# Patient Record
Sex: FEMALE | Race: WHITE | URBAN - METROPOLITAN AREA
[De-identification: names, ages, dates, MRNs, and addresses within clinical notes are randomized per-mention and may not be internally consistent; named-entity substitution may affect disease eponyms.]

---

## 2017-01-05 ENCOUNTER — APPOINTMENT (RX ONLY)
Dept: URBAN - METROPOLITAN AREA CLINIC 348 | Facility: CLINIC | Age: 72
Setting detail: DERMATOLOGY
End: 2017-01-05

## 2017-01-05 DIAGNOSIS — B35.3 TINEA PEDIS: ICD-10-CM

## 2017-01-05 DIAGNOSIS — L26 EXFOLIATIVE DERMATITIS: ICD-10-CM

## 2017-01-05 PROCEDURE — 99213 OFFICE O/P EST LOW 20 MIN: CPT

## 2017-01-05 PROCEDURE — ? TREATMENT REGIMEN

## 2017-01-05 PROCEDURE — ? PRESCRIPTION

## 2017-01-05 PROCEDURE — ? COUNSELING

## 2017-01-05 RX ORDER — KETOCONAZOLE 20 MG/G
CREAM TOPICAL
Qty: 1 | Refills: 3 | Status: ERX | COMMUNITY
Start: 2017-01-05

## 2017-01-05 RX ADMIN — KETOCONAZOLE: 20 CREAM TOPICAL at 19:48

## 2017-01-05 ASSESSMENT — LOCATION SIMPLE DESCRIPTION DERM
LOCATION SIMPLE: RIGHT FOOT
LOCATION SIMPLE: RIGHT HAND
LOCATION SIMPLE: RIGHT 4TH TOE
LOCATION SIMPLE: LEFT HAND

## 2017-01-05 ASSESSMENT — LOCATION DETAILED DESCRIPTION DERM
LOCATION DETAILED: LEFT RADIAL PALM
LOCATION DETAILED: 1ST WEBSPACE RIGHT FOOT
LOCATION DETAILED: RIGHT DORSAL 4TH TOE
LOCATION DETAILED: RIGHT RADIAL PALM

## 2017-01-05 ASSESSMENT — LOCATION ZONE DERM
LOCATION ZONE: FEET
LOCATION ZONE: TOE
LOCATION ZONE: HAND

## 2017-02-07 ENCOUNTER — APPOINTMENT (RX ONLY)
Dept: URBAN - METROPOLITAN AREA CLINIC 348 | Facility: CLINIC | Age: 72
Setting detail: DERMATOLOGY
End: 2017-02-07

## 2017-02-07 DIAGNOSIS — Z85.828 PERSONAL HISTORY OF OTHER MALIGNANT NEOPLASM OF SKIN: ICD-10-CM | Status: RESOLVED

## 2017-02-07 DIAGNOSIS — B35.3 TINEA PEDIS: ICD-10-CM | Status: IMPROVED

## 2017-02-07 DIAGNOSIS — L98.8 OTHER SPECIFIED DISORDERS OF THE SKIN AND SUBCUTANEOUS TISSUE: ICD-10-CM

## 2017-02-07 DIAGNOSIS — D18.0 HEMANGIOMA: ICD-10-CM

## 2017-02-07 DIAGNOSIS — L40.0 PSORIASIS VULGARIS: ICD-10-CM | Status: WELL CONTROLLED

## 2017-02-07 DIAGNOSIS — D22 MELANOCYTIC NEVI: ICD-10-CM

## 2017-02-07 DIAGNOSIS — L81.4 OTHER MELANIN HYPERPIGMENTATION: ICD-10-CM

## 2017-02-07 DIAGNOSIS — L82.1 OTHER SEBORRHEIC KERATOSIS: ICD-10-CM

## 2017-02-07 PROBLEM — E13.9 OTHER SPECIFIED DIABETES MELLITUS WITHOUT COMPLICATIONS: Status: ACTIVE | Noted: 2017-02-07

## 2017-02-07 PROBLEM — D22.71 MELANOCYTIC NEVI OF RIGHT LOWER LIMB, INCLUDING HIP: Status: ACTIVE | Noted: 2017-02-07

## 2017-02-07 PROBLEM — D22.5 MELANOCYTIC NEVI OF TRUNK: Status: ACTIVE | Noted: 2017-02-07

## 2017-02-07 PROBLEM — D22.72 MELANOCYTIC NEVI OF LEFT LOWER LIMB, INCLUDING HIP: Status: ACTIVE | Noted: 2017-02-07

## 2017-02-07 PROBLEM — D18.01 HEMANGIOMA OF SKIN AND SUBCUTANEOUS TISSUE: Status: ACTIVE | Noted: 2017-02-07

## 2017-02-07 PROCEDURE — ? OTHER

## 2017-02-07 PROCEDURE — 99214 OFFICE O/P EST MOD 30 MIN: CPT

## 2017-02-07 PROCEDURE — ? TREATMENT REGIMEN

## 2017-02-07 PROCEDURE — ? DEFER

## 2017-02-07 PROCEDURE — ? COUNSELING

## 2017-02-07 PROCEDURE — ? PRESCRIPTION

## 2017-02-07 RX ORDER — KETOCONAZOLE 20 MG/G
CREAM TOPICAL
Qty: 1 | Refills: 3 | Status: ERX

## 2017-02-07 RX ORDER — CLOBETASOL PROPIONATE 0.05 %
CREAM (GRAM) TOPICAL
Qty: 1 | Refills: 1 | Status: ERX

## 2017-02-07 ASSESSMENT — LOCATION ZONE DERM
LOCATION ZONE: TRUNK
LOCATION ZONE: FEET
LOCATION ZONE: FACE
LOCATION ZONE: ARM
LOCATION ZONE: TOE
LOCATION ZONE: LEG
LOCATION ZONE: NOSE

## 2017-02-07 ASSESSMENT — LOCATION SIMPLE DESCRIPTION DERM
LOCATION SIMPLE: RIGHT 4TH TOE
LOCATION SIMPLE: CHEST
LOCATION SIMPLE: RIGHT POSTERIOR THIGH
LOCATION SIMPLE: RIGHT UPPER ARM
LOCATION SIMPLE: LEFT UPPER BACK
LOCATION SIMPLE: LEFT NOSE
LOCATION SIMPLE: RIGHT FOOT
LOCATION SIMPLE: LEFT UPPER ARM
LOCATION SIMPLE: LEFT FOREHEAD
LOCATION SIMPLE: RIGHT UPPER BACK
LOCATION SIMPLE: RIGHT LOWER BACK
LOCATION SIMPLE: LEFT LOWER BACK
LOCATION SIMPLE: LEFT POSTERIOR THIGH
LOCATION SIMPLE: RIGHT THIGH
LOCATION SIMPLE: LEFT THIGH

## 2017-02-07 ASSESSMENT — LOCATION DETAILED DESCRIPTION DERM
LOCATION DETAILED: RIGHT SUPERIOR UPPER BACK
LOCATION DETAILED: LEFT ANTERIOR PROXIMAL THIGH
LOCATION DETAILED: LEFT INFERIOR UPPER BACK
LOCATION DETAILED: RIGHT DISTAL POSTERIOR THIGH
LOCATION DETAILED: 1ST WEBSPACE RIGHT FOOT
LOCATION DETAILED: LEFT ANTERIOR PROXIMAL UPPER ARM
LOCATION DETAILED: RIGHT ANTERIOR PROXIMAL UPPER ARM
LOCATION DETAILED: LEFT INFERIOR LATERAL MIDBACK
LOCATION DETAILED: RIGHT INFERIOR UPPER BACK
LOCATION DETAILED: RIGHT ANTERIOR DISTAL THIGH
LOCATION DETAILED: RIGHT ANTERIOR PROXIMAL THIGH
LOCATION DETAILED: LEFT SUPERIOR FOREHEAD
LOCATION DETAILED: RIGHT LATERAL SUPERIOR CHEST
LOCATION DETAILED: RIGHT LATERAL UPPER BACK
LOCATION DETAILED: RIGHT DORSAL 4TH TOE
LOCATION DETAILED: LEFT LATERAL SUPERIOR CHEST
LOCATION DETAILED: LEFT NASAL SIDEWALL
LOCATION DETAILED: LEFT ANTERIOR DISTAL THIGH
LOCATION DETAILED: LEFT DISTAL POSTERIOR THIGH
LOCATION DETAILED: RIGHT SUPERIOR LATERAL MIDBACK
LOCATION DETAILED: LEFT SUPERIOR LATERAL UPPER BACK

## 2017-02-07 ASSESSMENT — BSA PSORIASIS: % BODY COVERED IN PSORIASIS: 5

## 2017-02-07 NOTE — HPI: NON-MELANOMA SKIN CANCER F/U (HISTORY OF NMSC)
How Many Skin Cancers Have You Had?: one
What Is The Reason For Today's Visit?: Follow Up Non-Melanoma Skin Cancer
When Was Your Last Cancer Diagnosed?: 03/08/2012

## 2017-02-07 NOTE — PROCEDURE: TREATMENT REGIMEN
Action 2: Continue
Detail Level: Zone
Continue Regimen: Clobetasol cream twice daily to rossi of rash for two weeks then as needed for flares \\nEucerin Eczema relief cream as needed
Continue Regimen: Apply Ketoconazole cream twice daily to both feet
Detail Level: Detailed

## 2018-02-07 ENCOUNTER — APPOINTMENT (RX ONLY)
Dept: URBAN - METROPOLITAN AREA CLINIC 348 | Facility: CLINIC | Age: 73
Setting detail: DERMATOLOGY
End: 2018-02-07

## 2018-02-07 DIAGNOSIS — D485 NEOPLASM OF UNCERTAIN BEHAVIOR OF SKIN: ICD-10-CM

## 2018-02-07 DIAGNOSIS — Z85.828 PERSONAL HISTORY OF OTHER MALIGNANT NEOPLASM OF SKIN: ICD-10-CM | Status: RESOLVED

## 2018-02-07 DIAGNOSIS — D22 MELANOCYTIC NEVI: ICD-10-CM

## 2018-02-07 DIAGNOSIS — L81.4 OTHER MELANIN HYPERPIGMENTATION: ICD-10-CM

## 2018-02-07 DIAGNOSIS — L82.1 OTHER SEBORRHEIC KERATOSIS: ICD-10-CM

## 2018-02-07 DIAGNOSIS — D18.0 HEMANGIOMA: ICD-10-CM

## 2018-02-07 PROBLEM — D18.01 HEMANGIOMA OF SKIN AND SUBCUTANEOUS TISSUE: Status: ACTIVE | Noted: 2018-02-07

## 2018-02-07 PROBLEM — D22.5 MELANOCYTIC NEVI OF TRUNK: Status: ACTIVE | Noted: 2018-02-07

## 2018-02-07 PROBLEM — D48.5 NEOPLASM OF UNCERTAIN BEHAVIOR OF SKIN: Status: ACTIVE | Noted: 2018-02-07

## 2018-02-07 PROCEDURE — 11100: CPT

## 2018-02-07 PROCEDURE — ? OTHER

## 2018-02-07 PROCEDURE — ? COUNSELING

## 2018-02-07 PROCEDURE — ? BIOPSY BY SHAVE METHOD

## 2018-02-07 PROCEDURE — 99214 OFFICE O/P EST MOD 30 MIN: CPT | Mod: 25

## 2018-02-07 ASSESSMENT — LOCATION DETAILED DESCRIPTION DERM
LOCATION DETAILED: RIGHT LATERAL PROXIMAL UPPER ARM
LOCATION DETAILED: LEFT SUPERIOR UPPER BACK
LOCATION DETAILED: LEFT MID-UPPER BACK
LOCATION DETAILED: LEFT NASAL SIDEWALL
LOCATION DETAILED: RIGHT SUPERIOR LATERAL UPPER BACK
LOCATION DETAILED: SUPERIOR THORACIC SPINE

## 2018-02-07 ASSESSMENT — LOCATION SIMPLE DESCRIPTION DERM
LOCATION SIMPLE: LEFT NOSE
LOCATION SIMPLE: LEFT UPPER BACK
LOCATION SIMPLE: RIGHT BACK
LOCATION SIMPLE: RIGHT UPPER ARM
LOCATION SIMPLE: UPPER BACK

## 2018-02-07 ASSESSMENT — LOCATION ZONE DERM
LOCATION ZONE: NOSE
LOCATION ZONE: TRUNK
LOCATION ZONE: ARM

## 2018-02-07 NOTE — HPI: NON-MELANOMA SKIN CANCER F/U (HISTORY OF NMSC)
How Many Skin Cancers Have You Had?: one
What Is The Reason For Today's Visit?: History of Non-Melanoma Skin Cancer
When Was Your Last Cancer Diagnosed?: 2012

## 2018-02-07 NOTE — PROCEDURE: BIOPSY BY SHAVE METHOD
Biopsy Type: H and E
Anesthesia Type: 1% lidocaine without epinephrine
Additional Anesthesia Volume In Cc (Will Not Render If 0): 0
Electrodesiccation And Curettage Text: The wound bed was treated with electrodesiccation and curettage after the biopsy was performed.
Bill [id number] For Specimen Handling/Conveyance To Laboratory?: no
Notification Instructions: Patient to return to clinic in 2-4 weeks for biopsy results. Pt understands results will not be given over the phone.
Type Of Destruction Used: Curettage
Cryotherapy Text: The wound bed was treated with cryotherapy after the biopsy was performed.
Post-Care Instructions: I reviewed with the patient in detail post-care instructions. Patient is to keep the biopsy site dry overnight, and then apply bacitracin twice daily until healed. Patient may apply hydrogen peroxide soaks to remove any crusting.
Electrodesiccation Text: The wound bed was treated with electrodesiccation after the biopsy was performed.
Consent: Written consent was obtained and risks were reviewed including but not limited to scarring, infection, bleeding, scabbing, incomplete removal, nerve damage and allergy to anesthesia.
Curettage Text: The wound bed was treated with curettage after the biopsy was performed.
Anesthesia Volume In Cc (Will Not Render If 0): 1
Dressing: bandage
Detail Level: Detailed
Hemostasis: Aluminum Chloride
Billing Type: Third-Party Bill
Silver Nitrate Text: The wound bed was treated with silver nitrate after the biopsy was performed.
Wound Care: Aquaphor

## 2019-02-08 ENCOUNTER — APPOINTMENT (RX ONLY)
Dept: URBAN - METROPOLITAN AREA CLINIC 348 | Facility: CLINIC | Age: 74
Setting detail: DERMATOLOGY
End: 2019-02-08

## 2019-02-08 DIAGNOSIS — L81.4 OTHER MELANIN HYPERPIGMENTATION: ICD-10-CM

## 2019-02-08 DIAGNOSIS — D18.0 HEMANGIOMA: ICD-10-CM

## 2019-02-08 DIAGNOSIS — L82.1 OTHER SEBORRHEIC KERATOSIS: ICD-10-CM

## 2019-02-08 DIAGNOSIS — L85.3 XEROSIS CUTIS: ICD-10-CM

## 2019-02-08 DIAGNOSIS — Z85.828 PERSONAL HISTORY OF OTHER MALIGNANT NEOPLASM OF SKIN: ICD-10-CM

## 2019-02-08 DIAGNOSIS — D22 MELANOCYTIC NEVI: ICD-10-CM

## 2019-02-08 PROBLEM — D18.01 HEMANGIOMA OF SKIN AND SUBCUTANEOUS TISSUE: Status: ACTIVE | Noted: 2019-02-08

## 2019-02-08 PROBLEM — D22.5 MELANOCYTIC NEVI OF TRUNK: Status: ACTIVE | Noted: 2019-02-08

## 2019-02-08 PROBLEM — F32.9 MAJOR DEPRESSIVE DISORDER, SINGLE EPISODE, UNSPECIFIED: Status: ACTIVE | Noted: 2019-02-08

## 2019-02-08 PROBLEM — L40.0 PSORIASIS VULGARIS: Status: ACTIVE | Noted: 2019-02-08

## 2019-02-08 PROCEDURE — ? OTHER

## 2019-02-08 PROCEDURE — 99213 OFFICE O/P EST LOW 20 MIN: CPT

## 2019-02-08 PROCEDURE — ? COUNSELING

## 2019-02-08 PROCEDURE — ? TREATMENT REGIMEN

## 2019-02-08 ASSESSMENT — LOCATION DETAILED DESCRIPTION DERM
LOCATION DETAILED: SUPERIOR THORACIC SPINE
LOCATION DETAILED: LEFT MID-UPPER BACK
LOCATION DETAILED: LEFT SUPERIOR UPPER BACK
LOCATION DETAILED: LEFT NASAL SIDEWALL
LOCATION DETAILED: RIGHT SUPERIOR LATERAL UPPER BACK
LOCATION DETAILED: LEFT LATERAL ABDOMEN

## 2019-02-08 ASSESSMENT — LOCATION SIMPLE DESCRIPTION DERM
LOCATION SIMPLE: LEFT UPPER BACK
LOCATION SIMPLE: ABDOMEN
LOCATION SIMPLE: LEFT NOSE
LOCATION SIMPLE: RIGHT BACK
LOCATION SIMPLE: UPPER BACK

## 2019-02-08 ASSESSMENT — LOCATION ZONE DERM
LOCATION ZONE: NOSE
LOCATION ZONE: TRUNK

## 2020-02-10 ENCOUNTER — APPOINTMENT (RX ONLY)
Dept: URBAN - METROPOLITAN AREA CLINIC 348 | Facility: CLINIC | Age: 75
Setting detail: DERMATOLOGY
End: 2020-02-10

## 2020-02-10 DIAGNOSIS — Z85.828 PERSONAL HISTORY OF OTHER MALIGNANT NEOPLASM OF SKIN: ICD-10-CM

## 2020-02-10 DIAGNOSIS — D22 MELANOCYTIC NEVI: ICD-10-CM

## 2020-02-10 DIAGNOSIS — D18.0 HEMANGIOMA: ICD-10-CM

## 2020-02-10 DIAGNOSIS — L82.1 OTHER SEBORRHEIC KERATOSIS: ICD-10-CM

## 2020-02-10 DIAGNOSIS — L81.4 OTHER MELANIN HYPERPIGMENTATION: ICD-10-CM

## 2020-02-10 PROBLEM — D22.5 MELANOCYTIC NEVI OF TRUNK: Status: ACTIVE | Noted: 2020-02-10

## 2020-02-10 PROBLEM — D18.01 HEMANGIOMA OF SKIN AND SUBCUTANEOUS TISSUE: Status: ACTIVE | Noted: 2020-02-10

## 2020-02-10 PROCEDURE — ? COUNSELING

## 2020-02-10 PROCEDURE — 99214 OFFICE O/P EST MOD 30 MIN: CPT

## 2020-02-10 PROCEDURE — ? TREATMENT REGIMEN

## 2020-02-10 PROCEDURE — ? OTHER

## 2020-02-10 ASSESSMENT — LOCATION DETAILED DESCRIPTION DERM
LOCATION DETAILED: LEFT SUPERIOR UPPER BACK
LOCATION DETAILED: LEFT NASAL SIDEWALL
LOCATION DETAILED: SUPERIOR THORACIC SPINE
LOCATION DETAILED: LEFT MID-UPPER BACK
LOCATION DETAILED: RIGHT SUPERIOR LATERAL UPPER BACK

## 2020-02-10 ASSESSMENT — LOCATION SIMPLE DESCRIPTION DERM
LOCATION SIMPLE: LEFT NOSE
LOCATION SIMPLE: LEFT UPPER BACK
LOCATION SIMPLE: UPPER BACK
LOCATION SIMPLE: RIGHT BACK

## 2020-02-10 ASSESSMENT — LOCATION ZONE DERM
LOCATION ZONE: NOSE
LOCATION ZONE: TRUNK

## 2020-02-10 NOTE — PROCEDURE: MIPS QUALITY
Quality 111:Pneumonia Vaccination Status For Older Adults: Pneumococcal Vaccination Administered
Quality 47: Advance Care Plan: Advance Care Planning discussed and documented in the medical record; patient did not wish or was not able to name a surrogate decision maker or provide an advance care plan.
Quality 474: Zoster Vaccination Status: Shingrix Vaccination Administered or Previously Received
Detail Level: Detailed
Quality 110: Preventive Care And Screening: Influenza Immunization: Influenza Immunization Administered during Influenza season
Quality 130: Documentation Of Current Medications In The Medical Record: Current Medications Documented
Quality 226: Preventive Care And Screening: Tobacco Use: Screening And Cessation Intervention: Patient screened for tobacco use and is an ex/non-smoker

## 2021-02-08 ENCOUNTER — APPOINTMENT (RX ONLY)
Dept: URBAN - METROPOLITAN AREA CLINIC 348 | Facility: CLINIC | Age: 76
Setting detail: DERMATOLOGY
End: 2021-02-08

## 2021-02-08 DIAGNOSIS — L08.9 LOCAL INFECTION OF THE SKIN AND SUBCUTANEOUS TISSUE, UNSPECIFIED: ICD-10-CM

## 2021-02-08 DIAGNOSIS — L81.4 OTHER MELANIN HYPERPIGMENTATION: ICD-10-CM

## 2021-02-08 DIAGNOSIS — D22 MELANOCYTIC NEVI: ICD-10-CM

## 2021-02-08 DIAGNOSIS — L89 PRESSURE ULCER: ICD-10-CM

## 2021-02-08 DIAGNOSIS — L85.3 XEROSIS CUTIS: ICD-10-CM

## 2021-02-08 DIAGNOSIS — L82.1 OTHER SEBORRHEIC KERATOSIS: ICD-10-CM

## 2021-02-08 DIAGNOSIS — L82.0 INFLAMED SEBORRHEIC KERATOSIS: ICD-10-CM

## 2021-02-08 DIAGNOSIS — D18.0 HEMANGIOMA: ICD-10-CM

## 2021-02-08 DIAGNOSIS — Z85.828 PERSONAL HISTORY OF OTHER MALIGNANT NEOPLASM OF SKIN: ICD-10-CM

## 2021-02-08 PROBLEM — L89.319 PRESSURE ULCER OF RIGHT BUTTOCK, UNSPECIFIED STAGE: Status: ACTIVE | Noted: 2021-02-08

## 2021-02-08 PROBLEM — L89.329 PRESSURE ULCER OF LEFT BUTTOCK, UNSPECIFIED STAGE: Status: ACTIVE | Noted: 2021-02-08

## 2021-02-08 PROBLEM — D18.01 HEMANGIOMA OF SKIN AND SUBCUTANEOUS TISSUE: Status: ACTIVE | Noted: 2021-02-08

## 2021-02-08 PROBLEM — D22.5 MELANOCYTIC NEVI OF TRUNK: Status: ACTIVE | Noted: 2021-02-08

## 2021-02-08 PROCEDURE — ? OTHER

## 2021-02-08 PROCEDURE — ? ORDER TESTS

## 2021-02-08 PROCEDURE — ? PRESCRIPTION

## 2021-02-08 PROCEDURE — ? LIQUID NITROGEN

## 2021-02-08 PROCEDURE — ? COUNSELING

## 2021-02-08 PROCEDURE — ? TREATMENT REGIMEN

## 2021-02-08 PROCEDURE — 99213 OFFICE O/P EST LOW 20 MIN: CPT | Mod: 25

## 2021-02-08 PROCEDURE — 17110 DESTRUCTION B9 LES UP TO 14: CPT

## 2021-02-08 RX ORDER — MUPIROCIN 20 MG/G
OINTMENT TOPICAL TWICE DAILY
Qty: 1 | Refills: 1 | Status: ERX | COMMUNITY
Start: 2021-02-08

## 2021-02-08 RX ADMIN — MUPIROCIN: 20 OINTMENT TOPICAL at 00:00

## 2021-02-08 ASSESSMENT — LOCATION DETAILED DESCRIPTION DERM
LOCATION DETAILED: LEFT BUTTOCK
LOCATION DETAILED: SUPERIOR THORACIC SPINE
LOCATION DETAILED: RIGHT BUTTOCK
LOCATION DETAILED: RIGHT SUPERIOR LATERAL UPPER BACK
LOCATION DETAILED: RIGHT MEDIAL BUTTOCK
LOCATION DETAILED: LEFT SUPERIOR UPPER BACK
LOCATION DETAILED: LEFT MID-UPPER BACK
LOCATION DETAILED: LEFT NASAL SIDEWALL
LOCATION DETAILED: MIDDLE STERNUM

## 2021-02-08 ASSESSMENT — LOCATION SIMPLE DESCRIPTION DERM
LOCATION SIMPLE: LEFT BUTTOCK
LOCATION SIMPLE: RIGHT BUTTOCK
LOCATION SIMPLE: RIGHT BACK
LOCATION SIMPLE: UPPER BACK
LOCATION SIMPLE: LEFT NOSE
LOCATION SIMPLE: LEFT UPPER BACK
LOCATION SIMPLE: CHEST

## 2021-02-08 ASSESSMENT — LOCATION ZONE DERM
LOCATION ZONE: NOSE
LOCATION ZONE: TRUNK

## 2021-02-08 NOTE — PROCEDURE: OTHER
Other (Free Text): Patient given wound care information for [facility] Other (Free Text): Patient given wound care information for [facility]

## 2021-02-08 NOTE — PROCEDURE: COUNSELING
Detail Level: Detailed
Detail Level: Generalized
Detail Level: Zone
Moisturizer Recommendations: CeraVe SA

## 2021-02-08 NOTE — PROCEDURE: MIPS QUALITY
Quality 110: Preventive Care And Screening: Influenza Immunization: Influenza Immunization Administered during Influenza season
Detail Level: Detailed
Quality 111:Pneumonia Vaccination Status For Older Adults: Pneumococcal Vaccination Previously Received
Quality 226: Preventive Care And Screening: Tobacco Use: Screening And Cessation Intervention: Patient screened for tobacco use and is an ex/non-smoker
Quality 130: Documentation Of Current Medications In The Medical Record: Current Medications Documented
Quality 47: Advance Care Plan: Advance Care Planning discussed and documented in the medical record; patient did not wish or was not able to name a surrogate decision maker or provide an advance care plan.

## 2021-02-08 NOTE — PROCEDURE: TREATMENT REGIMEN
Detail Level: Zone
Otc Regimen: Cerave SA to improve texture
Plan: Encouraged purchasing donut pillow for sitting to relieve pressure
Initiate Treatment: Mupirocin-Apply to affected area twice daily for two weeks
Otc Regimen: Aquaphor

## 2022-01-27 ENCOUNTER — NEW REFERRAL (OUTPATIENT)
Dept: URBAN - METROPOLITAN AREA CLINIC 78 | Facility: CLINIC | Age: 77
End: 2022-01-27

## 2022-01-27 DIAGNOSIS — H25.13: ICD-10-CM

## 2022-01-27 DIAGNOSIS — H35.3122: ICD-10-CM

## 2022-01-27 DIAGNOSIS — H35.3114: ICD-10-CM

## 2022-01-27 DIAGNOSIS — E11.3213: ICD-10-CM

## 2022-01-27 PROCEDURE — 92250 FUNDUS PHOTOGRAPHY W/I&R: CPT

## 2022-01-27 PROCEDURE — 99204 OFFICE O/P NEW MOD 45 MIN: CPT

## 2022-01-27 PROCEDURE — 92134 CPTRZ OPH DX IMG PST SGM RTA: CPT

## 2022-01-27 ASSESSMENT — VISUAL ACUITY
OS_CC: 20/30
OD_CC: 20/400

## 2022-01-27 ASSESSMENT — TONOMETRY
OS_IOP_MMHG: 11
OD_IOP_MMHG: 10

## 2022-04-07 ENCOUNTER — FOLLOW UP (OUTPATIENT)
Dept: URBAN - METROPOLITAN AREA CLINIC 78 | Facility: CLINIC | Age: 77
End: 2022-04-07

## 2022-04-07 DIAGNOSIS — H35.3114: ICD-10-CM

## 2022-04-07 DIAGNOSIS — Z79.4: ICD-10-CM

## 2022-04-07 DIAGNOSIS — H35.3122: ICD-10-CM

## 2022-04-07 DIAGNOSIS — E11.3213: ICD-10-CM

## 2022-04-07 PROCEDURE — 92014 COMPRE OPH EXAM EST PT 1/>: CPT

## 2022-04-07 PROCEDURE — 92134 CPTRZ OPH DX IMG PST SGM RTA: CPT

## 2022-04-07 ASSESSMENT — TONOMETRY
OD_IOP_MMHG: 14
OS_IOP_MMHG: 12

## 2022-04-07 ASSESSMENT — VISUAL ACUITY
OS_CC: 20/25-1
OD_CC: CF 6FT

## 2024-12-07 NOTE — PROCEDURE: OTHER
Detail Level: Detailed
Other (Free Text): BCC Left Nasal Sidewall Tx: Dee's 5/1/2012
No Rx/Hx Recorded

## 2025-02-17 ENCOUNTER — FOLLOW UP (OUTPATIENT)
Dept: URBAN - METROPOLITAN AREA CLINIC 106 | Facility: CLINIC | Age: 80
End: 2025-02-17

## 2025-02-17 DIAGNOSIS — Z79.4: ICD-10-CM

## 2025-02-17 DIAGNOSIS — E11.3213: ICD-10-CM

## 2025-02-17 DIAGNOSIS — H35.3114: ICD-10-CM

## 2025-02-17 DIAGNOSIS — H35.3122: ICD-10-CM

## 2025-02-17 PROCEDURE — 92134 CPTRZ OPH DX IMG PST SGM RTA: CPT | Mod: NC

## 2025-02-17 PROCEDURE — 92014 COMPRE OPH EXAM EST PT 1/>: CPT | Mod: 25

## 2025-02-17 PROCEDURE — 92250 FUNDUS PHOTOGRAPHY W/I&R: CPT

## 2025-02-17 ASSESSMENT — VISUAL ACUITY
OS_PH: 20/50+2
OS_SC: 20/70+1
OD_SC: CF 5FT

## 2025-02-17 ASSESSMENT — TONOMETRY
OS_IOP_MMHG: 12
OD_IOP_MMHG: 10